# Patient Record
Sex: FEMALE | Race: BLACK OR AFRICAN AMERICAN
[De-identification: names, ages, dates, MRNs, and addresses within clinical notes are randomized per-mention and may not be internally consistent; named-entity substitution may affect disease eponyms.]

---

## 2018-05-02 ENCOUNTER — HOSPITAL ENCOUNTER (EMERGENCY)
Dept: HOSPITAL 45 - C.EDB | Age: 18
Discharge: HOME | End: 2018-05-02
Payer: COMMERCIAL

## 2018-05-02 VITALS — OXYGEN SATURATION: 98 % | SYSTOLIC BLOOD PRESSURE: 111 MMHG | HEART RATE: 61 BPM | DIASTOLIC BLOOD PRESSURE: 67 MMHG

## 2018-05-02 VITALS
WEIGHT: 113.1 LBS | BODY MASS INDEX: 20.55 KG/M2 | HEIGHT: 62.01 IN | WEIGHT: 113.1 LBS | BODY MASS INDEX: 20.55 KG/M2 | HEIGHT: 62.01 IN

## 2018-05-02 VITALS — TEMPERATURE: 98.24 F

## 2018-05-02 DIAGNOSIS — R11.2: Primary | ICD-10-CM

## 2018-05-02 DIAGNOSIS — F17.210: ICD-10-CM

## 2018-05-02 DIAGNOSIS — Z79.899: ICD-10-CM

## 2018-05-02 LAB
ALBUMIN SERPL-MCNC: 4.3 GM/DL (ref 3.4–5)
ALP SERPL-CCNC: 73 U/L (ref 45–117)
ALT SERPL-CCNC: 17 U/L (ref 12–78)
AST SERPL-CCNC: 13 U/L (ref 15–37)
BASOPHILS # BLD: 0.02 K/UL (ref 0–0.2)
BASOPHILS NFR BLD: 0.4 %
BUN SERPL-MCNC: 13 MG/DL (ref 7–18)
CALCIUM SERPL-MCNC: 8.7 MG/DL (ref 8.5–10.1)
CO2 SERPL-SCNC: 25 MMOL/L (ref 21–32)
CREAT SERPL-MCNC: 0.74 MG/DL (ref 0.6–1.2)
EOS ABS #: 0.03 K/UL (ref 0–0.5)
EOSINOPHIL NFR BLD AUTO: 206 K/UL (ref 130–400)
GLUCOSE SERPL-MCNC: 84 MG/DL (ref 70–99)
HCT VFR BLD CALC: 37.2 % (ref 37–47)
HGB BLD-MCNC: 13.1 G/DL (ref 12–16)
IG#: 0 K/UL (ref 0–0.02)
IMM GRANULOCYTES NFR BLD AUTO: 35.1 %
LYMPHOCYTES # BLD: 1.9 K/UL (ref 1.2–3.4)
MCH RBC QN AUTO: 30.1 PG (ref 25–34)
MCHC RBC AUTO-ENTMCNC: 35.2 G/DL (ref 32–36)
MCV RBC AUTO: 85.5 FL (ref 80–100)
MONO ABS #: 0.26 K/UL (ref 0.11–0.59)
MONOCYTES NFR BLD: 4.8 %
NEUT ABS #: 3.2 K/UL (ref 1.4–6.5)
NEUTROPHILS # BLD AUTO: 0.6 %
NEUTROPHILS NFR BLD AUTO: 59.1 %
PMV BLD AUTO: 9.9 FL (ref 7.4–10.4)
POTASSIUM SERPL-SCNC: 3.7 MMOL/L (ref 3.5–5.1)
PROT SERPL-MCNC: 7.7 GM/DL (ref 6.4–8.2)
RED CELL DISTRIBUTION WIDTH CV: 12.8 % (ref 11.5–14.5)
RED CELL DISTRIBUTION WIDTH SD: 40.5 FL (ref 36.4–46.3)
SODIUM SERPL-SCNC: 138 MMOL/L (ref 136–145)
WBC # BLD AUTO: 5.41 K/UL (ref 4.8–10.8)

## 2018-05-02 NOTE — EMERGENCY ROOM VISIT NOTE
History


First contact with patient:  12:41


Chief Complaint:  VOMITING


Stated Complaint:  VOMITING, DIZZINESS


Nursing Triage Summary:  


pt presents to ed with c/o excessive vomitting this am.  pt states dizziness 


too.





History of Present Illness


The patient is a 17 year old female who presents to the Emergency Room with 

complaints of vomiting.  The patient reports that she woke up this morning and 

had 4-5 episodes of vomiting.  She felt lightheaded at the time.  She states 

that now, she is feeling better but is still slightly lightheaded and has some 

abdominal discomfort.  She rates her overall discomfort a 4/10.  She is still 

slightly nauseous and has not attempted to eat or drink anything since the 

vomiting occurred.  She denies any ill contacts.  She states she is due to 

start her menstrual period.  She denies any changes in bowel movements, fevers 

or urinary symptoms.  She has not taken any medication for her symptoms.  She 

does report she has had similar symptoms in the past and has seen [facility] about it, and they told her that she is not drinking enough and 

needs to stay hydrated.





Review of Systems


A complete 10 point review of systems was reviewed with the patient with 

pertinent positives and negatives as per history of present illness. All else 

were negative.





Past Medical/Surgical History


Medical Problems:


(1) No significant active problems








Social History


Smoking Status:  Current Some Day Smoker


Occupation Status:  [employer]





Current/Historical Medications


Scheduled


Melatonin (Melatonin Maximum Strengt), 1 TAB PO HS


Ondasetron Odt (Zofran Odt), 4 MG SL Q6H





Physical Exam


Vital Signs











  Date Time  Temp Pulse Resp B/P (MAP) Pulse Ox O2 Delivery O2 Flow Rate FiO2


 


5/2/18 15:25  61 16 111/67 98   


 


5/2/18 15:03  70 18 103/57 98 Room Air  


 


5/2/18 13:40  53 16 108/58 98 Room Air  





  61  98/65    





  60  109/59    


 


5/2/18 13:38  60 18 109/59 98 Room Air  


 


5/2/18 13:14  60 18 102/62 98 Room Air  


 


5/2/18 12:38 36.8 87 20 108/74 95 Room Air  











Physical Exam


VITALS: Vitals are noted on the nurse's note and reviewed by myself.  Vital 

signs stable.


GENERAL: This is a 17-year-old female, in no acute distress, nondiaphoretic, 

well-developed well-nourished.


SKIN: The skin was without rashes.atic.  


EARS: External auditory canals clear, tympanic membranes pearly gray without 

erythema or effusion bilaterally.


EYES: Pupils equal round and reactive to light and accommodation.  


MOUTH: Mucous membranes moist.  Tonsils are not enlarged.  Pharynx without 

erythema or exudate. 


NECK: Supple without nuchal rigidity.  No lymphadenopathy. 


HEART: Regular rate and rhythm without murmurs gallops or rubs.


LUNGS: Clear to auscultation bilaterally without wheezes, rales or rhonchi. 


ABDOMEN: Positive bowel sounds x 4.  Soft, nondistended.  Mild tenderness to 

palpation across the lower abdomen without focal tenderness.  No guarding or 

rebound tenderness.


NEURO: Patient was alert and oriented to person place and time.





Medical Decision & Procedures


ER Provider


Diagnostic Interpretation:


ABDOMEN 2VIEW W/PA CHEST RTN





CLINICAL HISTORY: abd pain, vomiting    





COMPARISON STUDY:  No previous studies for comparison. 





FINDINGS: The soft tissues, psoas shadows, renal outlines and intestinal gas


pattern appear normal. There is no evidence for bowel obstruction. There is no


evidence for free intraperitoneal air. No abnormal abdominal calcifications are


seen. A frontal view of the chest was performed and is unremarkable.





IMPRESSION:  Normal study.





Laboratory Results


5/2/18 13:10








Red Blood Count 4.35, Mean Corpuscular Volume 85.5, Mean Corpuscular Hemoglobin 

30.1, Mean Corpuscular Hemoglobin Concent 35.2, Mean Platelet Volume 9.9, 

Neutrophils (%) (Auto) 59.1, Lymphocytes (%) (Auto) 35.1, Monocytes (%) (Auto) 

4.8, Eosinophils (%) (Auto) 0.6, Basophils (%) (Auto) 0.4, Neutrophils # (Auto) 

3.20, Lymphocytes # (Auto) 1.90, Monocytes # (Auto) 0.26, Eosinophils # (Auto) 

0.03, Basophils # (Auto) 0.02





5/2/18 13:10

















Test


  5/2/18


00:00 5/2/18


13:10


 


Urine Color YELLOW  


 


Urine Appearance CLEAR (CLEAR)  


 


Urine pH 5.0 (4.5-7.5)  


 


Urine Specific Gravity


  1.026


(1.000-1.030) 


 


 


Urine Protein NEG (NEG)  


 


Urine Glucose (UA) NEG (NEG)  


 


Urine Ketones NEG (NEG)  


 


Urine Occult Blood NEG (NEG)  


 


Urine Nitrite NEG (NEG)  


 


Urine Bilirubin NEG (NEG)  


 


Urine Urobilinogen NEG (NEG)  


 


Urine Leukocyte Esterase NEG (NEG)  


 


Urine Pregnancy Test NEG (NEG)  


 


White Blood Count


  


  5.41 K/uL


(4.8-10.8)


 


Red Blood Count


  


  4.35 M/uL


(4.2-5.4)


 


Hemoglobin


  


  13.1 g/dL


(12.0-16.0)


 


Hematocrit  37.2 % (37-47) 


 


Mean Corpuscular Volume


  


  85.5 fL


()


 


Mean Corpuscular Hemoglobin


  


  30.1 pg


(25-34)


 


Mean Corpuscular Hemoglobin


Concent 


  35.2 g/dl


(32-36)


 


Platelet Count


  


  206 K/uL


(130-400)


 


Mean Platelet Volume


  


  9.9 fL


(7.4-10.4)


 


Neutrophils (%) (Auto)  59.1 % 


 


Lymphocytes (%) (Auto)  35.1 % 


 


Monocytes (%) (Auto)  4.8 % 


 


Eosinophils (%) (Auto)  0.6 % 


 


Basophils (%) (Auto)  0.4 % 


 


Neutrophils # (Auto)


  


  3.20 K/uL


(1.4-6.5)


 


Lymphocytes # (Auto)


  


  1.90 K/uL


(1.2-3.4)


 


Monocytes # (Auto)


  


  0.26 K/uL


(0.11-0.59)


 


Eosinophils # (Auto)


  


  0.03 K/uL


(0-0.5)


 


Basophils # (Auto)


  


  0.02 K/uL


(0-0.2)


 


RDW Standard Deviation


  


  40.5 fL


(36.4-46.3)


 


RDW Coefficient of Variation


  


  12.8 %


(11.5-14.5)


 


Immature Granulocyte % (Auto)  0.0 % 


 


Immature Granulocyte # (Auto)


  


  0.00 K/uL


(0.00-0.02)


 


Anion Gap


  


  6.0 mmol/L


(3-11)


 


Est Creatinine Clear Calc


Drug Dose 


  96.7 ml/min 


 


 


Estimated GFR (


American) 


  136.1 


 


 


Estimated GFR (Non-


American 


  117.4 


 


 


BUN/Creatinine Ratio  17.2 (10-20) 


 


Calcium Level


  


  8.7 mg/dl


(8.5-10.1)


 


Total Bilirubin


  


  0.4 mg/dl


(0.2-1)


 


Aspartate Amino Transf


(AST/SGOT) 


  13 U/L (15-37) 


 


 


Alanine Aminotransferase


(ALT/SGPT) 


  17 U/L (12-78) 


 


 


Alkaline Phosphatase


  


  73 U/L


()


 


Total Protein


  


  7.7 gm/dl


(6.4-8.2)


 


Albumin


  


  4.3 gm/dl


(3.4-5.0)


 


Globulin


  


  3.4 gm/dl


(2.5-4.0)


 


Albumin/Globulin Ratio  1.3 (0.9-2) 


 


Human Chorionic Gonadotropin,


Qual 


  NEG (NEG) 


 











Medications Administered











 Medications


  (Trade)  Dose


 Ordered  Sig/Walter


 Route  Start Time


 Stop Time Status Last Admin


Dose Admin


 


 Sodium Chloride  1,000 ml @ 


 999 mls/hr  Q1H1M STAT


 IV  5/2/18 12:54


 5/2/18 13:54 DC 5/2/18 13:16


999 MLS/HR


 


 Ondansetron HCl


  (Zofran Inj)  4 mg  NOW  STAT


 IV  5/2/18 12:54


 5/2/18 12:55 DC 5/2/18 13:17


4 MG











Medical Decision


Differential diagnosis includes gastroenteritis, cholecystitis, gastritis, 

pregnancy, UTI, kidney stone, appendicitis, among others.





The patient is a 17-year-old female who presents today complaining of vomiting 

which began this morning.  The patient's symptoms had essentially resolved by 

the time of her arrival.  She had only minor nausea and lightheadedness at this 

time.  Labs revealed no leukocytosis, anemia or concerning electrolyte 

abnormalities.  Urinalysis was not suggestive of infection.  Urine pregnancy 

was negative.  Abdominal series unremarkable.  She was given IV fluids and 

antiemetics with improvement of her nausea.  She will be discharged home with a 

prescription for Zofran and was advised to follow-up with [facility].  She was able to 

tolerate fluids by mouth prior to discharge.





Based on the patient's presentation and work up, I feel the patient is stable 

for outpatient treatment.  The patient was educated to return to the emergency 

department for any worsening of their current condition or new/concerning 

symptoms.  She will follow up with [facility].





Medication Reconcilliation


Current Medication List:  was personally reviewed by me





Blood Pressure Screening


Patient's blood pressure:  Normal blood pressure





Impression





 Primary Impression:  


 Vomiting





Departure Information


Dispostion


Home / Self-Care





Condition


GOOD





Prescriptions





Ondasetron Odt (ZOFRAN ODT) 4 Mg Tab


4 MG SL Q6H for Nausea, #12 TAB


   Prov: Leiva, Arely Henderson PA-C         5/2/18





Referrals


[facility] (PCP)





Patient Instructions


My [facility]





Additional Instructions





You have been prescribed Zofran to be used for any nausea or vomiting. Take as 

prescribed.





For pain control, you can use the following over-the-counter medicines (if >11 yo):





- Regular strength (325mg/tab) Tylenol (acetaminophen) 2 tabs every 4-6 hours 

as needed. Do not exceed 12 tablets in a 24 hour period. Avoid taking more than 

4 grams (4000 mg) of Tylenol per day. This includes any other sources of 

acetaminophen you may take on a regular basis.





- Regular strength (200 mg/tab) Advil (ibuprofen) 1-2 tabs every 4-6 hours as 

needed. Do not exceed a dose of 3200 mg per day.





Rest and drink plenty of fluids.





You should keep a diet of clear liquids for the next 24 hours.  As you are 

feeling better, you may advance your diet to include crackers, toast, applesauce

, rice and other bland foods such as these.





Return to the emergency department with worsening vomiting, abdominal pain, 

fevers or other new/concerning symptoms.





Problem Qualifiers








 Primary Impression:  


 Vomiting


 Vomiting type:  unspecified  Vomiting Intractability:  non-intractable  Nausea 

presence:  with nausea  Qualified Codes:  R11.2 - Nausea with vomiting, 

unspecified

## 2018-05-02 NOTE — DIAGNOSTIC IMAGING REPORT
ABDOMEN 2VIEW W/PA CHEST RTN



CLINICAL HISTORY: abd pain, vomiting    



COMPARISON STUDY:  No previous studies for comparison. 



FINDINGS: The soft tissues, psoas shadows, renal outlines and intestinal gas

pattern appear normal. There is no evidence for bowel obstruction. There is no

evidence for free intraperitoneal air. No abnormal abdominal calcifications are

seen. A frontal view of the chest was performed and is unremarkable.



IMPRESSION:  Normal study. 











The above report was generated using voice recognition software.  It may contain

grammatical, syntax or spelling errors.







Electronically signed by:  Jonathan Torres M.D.

5/2/2018 3:00 PM



Dictated Date/Time:  5/2/2018 2:59 PM